# Patient Record
Sex: MALE | Race: AMERICAN INDIAN OR ALASKA NATIVE
[De-identification: names, ages, dates, MRNs, and addresses within clinical notes are randomized per-mention and may not be internally consistent; named-entity substitution may affect disease eponyms.]

---

## 2020-03-12 ENCOUNTER — HOSPITAL ENCOUNTER (OUTPATIENT)
Dept: HOSPITAL 35 - HYPER | Age: 80
End: 2020-03-12
Attending: PREVENTIVE MEDICINE
Payer: COMMERCIAL

## 2020-03-12 ENCOUNTER — HOSPITAL ENCOUNTER (OUTPATIENT)
Dept: HOSPITAL 35 - SJCVCIMAG | Age: 80
End: 2020-03-12
Attending: PREVENTIVE MEDICINE
Payer: COMMERCIAL

## 2020-03-12 DIAGNOSIS — T81.31XA: Primary | ICD-10-CM

## 2020-03-12 DIAGNOSIS — L97.312: ICD-10-CM

## 2020-03-12 DIAGNOSIS — Z98.41: ICD-10-CM

## 2020-03-12 DIAGNOSIS — I70.202: ICD-10-CM

## 2020-03-12 DIAGNOSIS — F32.9: ICD-10-CM

## 2020-03-12 DIAGNOSIS — E78.5: ICD-10-CM

## 2020-03-12 DIAGNOSIS — Z95.0: ICD-10-CM
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
## 2020-03-12 DIAGNOSIS — I10: ICD-10-CM

## 2020-03-12 DIAGNOSIS — Z98.42: ICD-10-CM

## 2020-03-12 DIAGNOSIS — F17.210: ICD-10-CM
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
## 2020-03-12 DIAGNOSIS — I70.233: Primary | ICD-10-CM

## 2020-03-12 DIAGNOSIS — I25.10: ICD-10-CM

## 2020-03-12 DIAGNOSIS — L97.319: ICD-10-CM

## 2020-03-12 DIAGNOSIS — Z79.01: ICD-10-CM

## 2020-03-12 DIAGNOSIS — M19.90: ICD-10-CM

## 2020-03-12 DIAGNOSIS — Z95.1: ICD-10-CM

## 2020-03-16 ENCOUNTER — HOSPITAL ENCOUNTER (OUTPATIENT)
Dept: HOSPITAL 35 - CATH | Age: 80
Discharge: HOME | End: 2020-03-16
Attending: RADIOLOGY
Payer: MEDICARE

## 2020-03-16 VITALS — WEIGHT: 165.35 LBS | HEIGHT: 68 IN | BODY MASS INDEX: 25.06 KG/M2

## 2020-03-16 VITALS — SYSTOLIC BLOOD PRESSURE: 128 MMHG | DIASTOLIC BLOOD PRESSURE: 80 MMHG

## 2020-03-16 DIAGNOSIS — Z86.73: ICD-10-CM

## 2020-03-16 DIAGNOSIS — J43.9: ICD-10-CM

## 2020-03-16 DIAGNOSIS — I70.1: ICD-10-CM

## 2020-03-16 DIAGNOSIS — I77.1: ICD-10-CM

## 2020-03-16 DIAGNOSIS — L97.919: ICD-10-CM

## 2020-03-16 DIAGNOSIS — E78.5: ICD-10-CM

## 2020-03-16 DIAGNOSIS — Z79.899: ICD-10-CM

## 2020-03-16 DIAGNOSIS — I73.9: Primary | ICD-10-CM

## 2020-03-16 DIAGNOSIS — Z98.890: ICD-10-CM

## 2020-03-16 DIAGNOSIS — Z95.0: ICD-10-CM

## 2020-03-16 DIAGNOSIS — I10: ICD-10-CM

## 2020-03-16 DIAGNOSIS — K21.9: ICD-10-CM

## 2020-03-16 DIAGNOSIS — F17.210: ICD-10-CM

## 2020-03-16 LAB
ANION GAP SERPL CALC-SCNC: 8 MMOL/L (ref 7–16)
BUN SERPL-MCNC: 19 MG/DL (ref 7–18)
CALCIUM SERPL-MCNC: 9.2 MG/DL (ref 8.5–10.1)
CHLORIDE SERPL-SCNC: 106 MMOL/L (ref 98–107)
CO2 SERPL-SCNC: 28 MMOL/L (ref 21–32)
CREAT SERPL-MCNC: 1.1 MG/DL (ref 0.7–1.3)
ERYTHROCYTE [DISTWIDTH] IN BLOOD BY AUTOMATED COUNT: 14.6 % (ref 10.5–14.5)
GLUCOSE SERPL-MCNC: 99 MG/DL (ref 74–106)
HCT VFR BLD CALC: 40.1 % (ref 42–52)
HGB BLD-MCNC: 13.2 GM/DL (ref 14–18)
MCH RBC QN AUTO: 31.1 PG (ref 26–34)
MCHC RBC AUTO-ENTMCNC: 33 G/DL (ref 28–37)
MCV RBC: 94.3 FL (ref 80–100)
PLATELET # BLD: 175 THOU/UL (ref 150–400)
POTASSIUM SERPL-SCNC: 4 MMOL/L (ref 3.5–5.1)
RBC # BLD AUTO: 4.25 MIL/UL (ref 4.5–6)
SODIUM SERPL-SCNC: 142 MMOL/L (ref 136–145)
WBC # BLD AUTO: 6.6 THOU/UL (ref 4–11)

## 2020-03-23 ENCOUNTER — HOSPITAL ENCOUNTER (OUTPATIENT)
Dept: HOSPITAL 35 - CATH | Age: 80
Discharge: HOME | End: 2020-03-23
Attending: RADIOLOGY
Payer: MEDICARE

## 2020-03-23 VITALS — DIASTOLIC BLOOD PRESSURE: 80 MMHG | SYSTOLIC BLOOD PRESSURE: 138 MMHG

## 2020-03-23 VITALS — SYSTOLIC BLOOD PRESSURE: 149 MMHG | DIASTOLIC BLOOD PRESSURE: 99 MMHG

## 2020-03-23 VITALS — SYSTOLIC BLOOD PRESSURE: 149 MMHG | DIASTOLIC BLOOD PRESSURE: 101 MMHG

## 2020-03-23 VITALS — SYSTOLIC BLOOD PRESSURE: 147 MMHG | DIASTOLIC BLOOD PRESSURE: 88 MMHG

## 2020-03-23 VITALS — DIASTOLIC BLOOD PRESSURE: 79 MMHG | SYSTOLIC BLOOD PRESSURE: 129 MMHG

## 2020-03-23 VITALS — DIASTOLIC BLOOD PRESSURE: 99 MMHG | SYSTOLIC BLOOD PRESSURE: 136 MMHG

## 2020-03-23 VITALS — DIASTOLIC BLOOD PRESSURE: 105 MMHG | SYSTOLIC BLOOD PRESSURE: 161 MMHG

## 2020-03-23 VITALS — SYSTOLIC BLOOD PRESSURE: 149 MMHG | DIASTOLIC BLOOD PRESSURE: 103 MMHG

## 2020-03-23 VITALS — SYSTOLIC BLOOD PRESSURE: 154 MMHG | DIASTOLIC BLOOD PRESSURE: 101 MMHG

## 2020-03-23 VITALS — HEIGHT: 69 IN | BODY MASS INDEX: 24.44 KG/M2 | WEIGHT: 165 LBS

## 2020-03-23 DIAGNOSIS — Z95.1: ICD-10-CM

## 2020-03-23 DIAGNOSIS — Z79.899: ICD-10-CM

## 2020-03-23 DIAGNOSIS — E78.5: ICD-10-CM

## 2020-03-23 DIAGNOSIS — I11.0: ICD-10-CM

## 2020-03-23 DIAGNOSIS — Z98.890: ICD-10-CM

## 2020-03-23 DIAGNOSIS — Z79.01: ICD-10-CM

## 2020-03-23 DIAGNOSIS — I50.9: ICD-10-CM

## 2020-03-23 DIAGNOSIS — Z95.0: ICD-10-CM

## 2020-03-23 DIAGNOSIS — Z71.6: ICD-10-CM

## 2020-03-23 DIAGNOSIS — L97.911: ICD-10-CM

## 2020-03-23 DIAGNOSIS — F17.210: ICD-10-CM

## 2020-03-23 DIAGNOSIS — I70.238: Primary | ICD-10-CM

## 2020-03-23 DIAGNOSIS — J43.9: ICD-10-CM

## 2020-03-23 DIAGNOSIS — I48.0: ICD-10-CM

## 2020-03-23 DIAGNOSIS — Z86.73: ICD-10-CM

## 2020-03-23 PROCEDURE — 10081 I&D PILONIDAL CYST COMP: CPT

## 2020-03-23 NOTE — NUR
ASSUMED CARE OF PT AT APPROX 1630. PT FROM CATH LAB, POST ANGIOGRAM WITH 2
STENTS IN R SFA, THROUGH R GROIN. GROIN IS CDI AND REMAINS SOFT, WITHOUT
HEMATOMA AT THIS TIME. BEDREST COMPLETE AT 1900. AFTER WALKING PT, IF HE IS
STABLE CAN BE DISCHARGED. WILL CONTINUE TO MONITOR.

## 2020-03-23 NOTE — NUR
PT R GROIN SITE CONTINUES TO BE CDI, SOFT WITH NO HEMATOMA. VSS. NO C/O PAIN
OR SOA. DAUGHTER REMAINS AT BEDSIDE AND WILL PROVIDE RIDE HOME. DISCHARGE
ORDERS COMPLETE. TELE AND IV DC'D.

## 2020-03-26 ENCOUNTER — HOSPITAL ENCOUNTER (OUTPATIENT)
Dept: HOSPITAL 35 - HYPER | Age: 80
End: 2020-03-26
Attending: PREVENTIVE MEDICINE
Payer: MEDICARE

## 2020-03-26 DIAGNOSIS — I10: ICD-10-CM

## 2020-03-26 DIAGNOSIS — H26.9: ICD-10-CM

## 2020-03-26 DIAGNOSIS — L97.312: ICD-10-CM
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
## 2020-03-26 DIAGNOSIS — E78.5: ICD-10-CM

## 2020-03-26 DIAGNOSIS — I25.10: ICD-10-CM

## 2020-03-26 DIAGNOSIS — T81.31XD: Primary | ICD-10-CM

## 2020-03-26 DIAGNOSIS — F32.9: ICD-10-CM

## 2020-03-26 DIAGNOSIS — M19.90: ICD-10-CM

## 2020-03-26 DIAGNOSIS — F17.210: ICD-10-CM

## 2020-04-07 ENCOUNTER — HOSPITAL ENCOUNTER (OUTPATIENT)
Dept: HOSPITAL 35 - HYPER | Age: 80
End: 2020-04-07
Attending: PREVENTIVE MEDICINE
Payer: MEDICARE
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
## 2020-04-07 DIAGNOSIS — I10: ICD-10-CM

## 2020-04-07 DIAGNOSIS — T81.31XD: Primary | ICD-10-CM

## 2020-04-07 DIAGNOSIS — Z47.1: ICD-10-CM

## 2020-04-07 DIAGNOSIS — M19.90: ICD-10-CM

## 2020-04-07 DIAGNOSIS — E78.5: ICD-10-CM

## 2020-04-07 DIAGNOSIS — I25.10: ICD-10-CM

## 2020-04-07 DIAGNOSIS — F32.9: ICD-10-CM

## 2020-04-07 DIAGNOSIS — L97.312: ICD-10-CM

## 2020-04-07 DIAGNOSIS — F17.200: ICD-10-CM

## 2020-04-21 ENCOUNTER — HOSPITAL ENCOUNTER (OUTPATIENT)
Dept: HOSPITAL 35 - HYPER | Age: 80
End: 2020-04-21
Attending: EMERGENCY MEDICINE
Payer: MEDICARE

## 2020-04-21 DIAGNOSIS — F32.9: ICD-10-CM

## 2020-04-21 DIAGNOSIS — Z95.1: ICD-10-CM

## 2020-04-21 DIAGNOSIS — H26.9: ICD-10-CM
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
## 2020-04-21 DIAGNOSIS — T81.31XD: Primary | ICD-10-CM

## 2020-04-21 DIAGNOSIS — M19.90: ICD-10-CM

## 2020-04-21 DIAGNOSIS — F17.210: ICD-10-CM

## 2020-04-21 DIAGNOSIS — L97.312: ICD-10-CM

## 2020-04-21 DIAGNOSIS — E78.5: ICD-10-CM

## 2020-04-21 DIAGNOSIS — I10: ICD-10-CM

## 2020-04-28 ENCOUNTER — HOSPITAL ENCOUNTER (OUTPATIENT)
Dept: HOSPITAL 35 - HYPER | Age: 80
End: 2020-04-28
Attending: PREVENTIVE MEDICINE
Payer: MEDICARE

## 2020-04-28 ENCOUNTER — HOSPITAL ENCOUNTER (OUTPATIENT)
Dept: HOSPITAL 35 - SJCVCIMAG | Age: 80
End: 2020-04-28
Attending: RADIOLOGY
Payer: MEDICARE

## 2020-04-28 DIAGNOSIS — I73.9: Primary | ICD-10-CM

## 2020-04-28 DIAGNOSIS — Z98.890: ICD-10-CM

## 2020-04-28 DIAGNOSIS — Z86.79: ICD-10-CM

## 2020-04-28 DIAGNOSIS — F17.210: ICD-10-CM

## 2020-04-28 DIAGNOSIS — H26.9: ICD-10-CM

## 2020-04-28 DIAGNOSIS — I25.10: ICD-10-CM

## 2020-04-28 DIAGNOSIS — T81.31XD: Primary | ICD-10-CM

## 2020-04-28 DIAGNOSIS — Z79.01: ICD-10-CM

## 2020-04-28 DIAGNOSIS — L97.312: ICD-10-CM

## 2020-04-28 DIAGNOSIS — M19.90: ICD-10-CM

## 2020-04-28 DIAGNOSIS — I10: ICD-10-CM

## 2020-04-28 DIAGNOSIS — I48.0: ICD-10-CM

## 2020-04-28 DIAGNOSIS — I71.4: ICD-10-CM
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
## 2020-04-28 DIAGNOSIS — Z79.82: ICD-10-CM

## 2020-04-28 DIAGNOSIS — F32.9: ICD-10-CM

## 2020-04-28 DIAGNOSIS — E78.5: ICD-10-CM

## 2020-05-01 ENCOUNTER — HOSPITAL ENCOUNTER (OUTPATIENT)
Dept: HOSPITAL 35 - CATH | Age: 80
Discharge: HOME | End: 2020-05-01
Attending: RADIOLOGY
Payer: MEDICARE

## 2020-05-01 VITALS — HEIGHT: 68 IN | BODY MASS INDEX: 25.01 KG/M2 | WEIGHT: 165 LBS

## 2020-05-01 VITALS — SYSTOLIC BLOOD PRESSURE: 93 MMHG | DIASTOLIC BLOOD PRESSURE: 58 MMHG

## 2020-05-01 VITALS — DIASTOLIC BLOOD PRESSURE: 51 MMHG | SYSTOLIC BLOOD PRESSURE: 88 MMHG

## 2020-05-01 VITALS — DIASTOLIC BLOOD PRESSURE: 64 MMHG | SYSTOLIC BLOOD PRESSURE: 91 MMHG

## 2020-05-01 VITALS — DIASTOLIC BLOOD PRESSURE: 92 MMHG | SYSTOLIC BLOOD PRESSURE: 156 MMHG

## 2020-05-01 DIAGNOSIS — F17.210: ICD-10-CM

## 2020-05-01 DIAGNOSIS — J43.9: ICD-10-CM

## 2020-05-01 DIAGNOSIS — Z79.01: ICD-10-CM

## 2020-05-01 DIAGNOSIS — Z98.890: ICD-10-CM

## 2020-05-01 DIAGNOSIS — Z79.899: ICD-10-CM

## 2020-05-01 DIAGNOSIS — I70.213: Primary | ICD-10-CM

## 2020-05-01 DIAGNOSIS — M79.605: ICD-10-CM

## 2020-05-01 DIAGNOSIS — E78.5: ICD-10-CM

## 2020-05-01 DIAGNOSIS — K21.9: ICD-10-CM

## 2020-05-01 DIAGNOSIS — Z95.1: ICD-10-CM

## 2020-05-01 LAB
ANION GAP SERPL CALC-SCNC: 7 MMOL/L (ref 7–16)
BASOPHILS NFR BLD AUTO: 0.8 % (ref 0–2)
BUN SERPL-MCNC: 26 MG/DL (ref 7–18)
CALCIUM SERPL-MCNC: 8.8 MG/DL (ref 8.5–10.1)
CHLORIDE SERPL-SCNC: 106 MMOL/L (ref 98–107)
CO2 SERPL-SCNC: 27 MMOL/L (ref 21–32)
CREAT SERPL-MCNC: 1.1 MG/DL (ref 0.7–1.3)
EOSINOPHIL NFR BLD: 2.5 % (ref 0–3)
ERYTHROCYTE [DISTWIDTH] IN BLOOD BY AUTOMATED COUNT: 14.9 % (ref 10.5–14.5)
GLUCOSE SERPL-MCNC: 90 MG/DL (ref 74–106)
GRANULOCYTES NFR BLD MANUAL: 55.2 % (ref 36–66)
HCT VFR BLD CALC: 36.2 % (ref 42–52)
HGB BLD-MCNC: 12.1 GM/DL (ref 14–18)
LYMPHOCYTES NFR BLD AUTO: 31.1 % (ref 24–44)
MCH RBC QN AUTO: 31.2 PG (ref 26–34)
MCHC RBC AUTO-ENTMCNC: 33.4 G/DL (ref 28–37)
MCV RBC: 93.4 FL (ref 80–100)
MONOCYTES NFR BLD: 10.4 % (ref 1–8)
NEUTROPHILS # BLD: 3 THOU/UL (ref 1.4–8.2)
PLATELET # BLD: 143 THOU/UL (ref 150–400)
POTASSIUM SERPL-SCNC: 3.9 MMOL/L (ref 3.5–5.1)
RBC # BLD AUTO: 3.88 MIL/UL (ref 4.5–6)
SODIUM SERPL-SCNC: 140 MMOL/L (ref 136–145)
WBC # BLD AUTO: 5.5 THOU/UL (ref 4–11)

## 2020-05-05 ENCOUNTER — HOSPITAL ENCOUNTER (OUTPATIENT)
Dept: HOSPITAL 35 - HYPER | Age: 80
End: 2020-05-05
Attending: PREVENTIVE MEDICINE
Payer: MEDICARE

## 2020-05-05 DIAGNOSIS — T81.31XD: Primary | ICD-10-CM

## 2020-05-05 DIAGNOSIS — H26.9: ICD-10-CM

## 2020-05-05 DIAGNOSIS — F32.9: ICD-10-CM
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
## 2020-05-05 DIAGNOSIS — M19.90: ICD-10-CM

## 2020-05-05 DIAGNOSIS — Z47.1: ICD-10-CM

## 2020-05-05 DIAGNOSIS — I25.10: ICD-10-CM

## 2020-05-05 DIAGNOSIS — E78.5: ICD-10-CM

## 2020-05-05 DIAGNOSIS — F17.210: ICD-10-CM

## 2020-05-05 DIAGNOSIS — L97.312: ICD-10-CM

## 2020-05-05 DIAGNOSIS — L84: ICD-10-CM

## 2020-05-05 DIAGNOSIS — I10: ICD-10-CM

## 2020-05-12 ENCOUNTER — HOSPITAL ENCOUNTER (OUTPATIENT)
Dept: HOSPITAL 35 - HYPER | Age: 80
End: 2020-05-12
Attending: PREVENTIVE MEDICINE
Payer: MEDICARE

## 2020-05-12 DIAGNOSIS — L97.312: ICD-10-CM

## 2020-05-12 DIAGNOSIS — I10: ICD-10-CM

## 2020-05-12 DIAGNOSIS — T81.31XD: Primary | ICD-10-CM

## 2020-05-12 DIAGNOSIS — M19.90: ICD-10-CM

## 2020-05-12 DIAGNOSIS — E78.5: ICD-10-CM

## 2020-05-12 DIAGNOSIS — Z79.82: ICD-10-CM

## 2020-05-12 DIAGNOSIS — F32.9: ICD-10-CM

## 2020-05-12 DIAGNOSIS — H26.9: ICD-10-CM

## 2020-05-12 DIAGNOSIS — I25.10: ICD-10-CM

## 2020-05-12 DIAGNOSIS — Z79.01: ICD-10-CM
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
## 2020-05-12 DIAGNOSIS — F17.210: ICD-10-CM

## 2020-05-20 ENCOUNTER — HOSPITAL ENCOUNTER (OUTPATIENT)
Dept: HOSPITAL 35 - HYPER | Age: 80
End: 2020-05-20
Attending: PREVENTIVE MEDICINE
Payer: MEDICARE

## 2020-05-20 DIAGNOSIS — H26.9: ICD-10-CM

## 2020-05-20 DIAGNOSIS — F17.210: ICD-10-CM

## 2020-05-20 DIAGNOSIS — M19.90: ICD-10-CM

## 2020-05-20 DIAGNOSIS — L97.312: ICD-10-CM

## 2020-05-20 DIAGNOSIS — Z47.1: ICD-10-CM

## 2020-05-20 DIAGNOSIS — I25.10: ICD-10-CM

## 2020-05-20 DIAGNOSIS — F32.9: ICD-10-CM

## 2020-05-20 DIAGNOSIS — I10: ICD-10-CM

## 2020-05-20 DIAGNOSIS — E78.5: ICD-10-CM

## 2020-05-20 DIAGNOSIS — Z95.1: ICD-10-CM

## 2020-05-20 DIAGNOSIS — T81.31XD: Primary | ICD-10-CM

## 2020-05-20 DIAGNOSIS — L84: ICD-10-CM
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
## 2020-06-09 ENCOUNTER — HOSPITAL ENCOUNTER (OUTPATIENT)
Dept: HOSPITAL 35 - HYPER | Age: 80
End: 2020-06-09
Attending: PREVENTIVE MEDICINE
Payer: MEDICARE

## 2020-06-09 DIAGNOSIS — F32.9: ICD-10-CM

## 2020-06-09 DIAGNOSIS — H26.9: ICD-10-CM

## 2020-06-09 DIAGNOSIS — I10: ICD-10-CM

## 2020-06-09 DIAGNOSIS — T81.31XD: Primary | ICD-10-CM

## 2020-06-09 DIAGNOSIS — E78.5: ICD-10-CM

## 2020-06-09 DIAGNOSIS — F17.200: ICD-10-CM

## 2020-06-09 DIAGNOSIS — M19.90: ICD-10-CM

## 2020-06-09 DIAGNOSIS — Z47.1: ICD-10-CM
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
## 2020-06-09 DIAGNOSIS — L97.312: ICD-10-CM

## 2020-06-09 DIAGNOSIS — I25.10: ICD-10-CM

## 2020-08-04 ENCOUNTER — HOSPITAL ENCOUNTER (OUTPATIENT)
Dept: HOSPITAL 35 - SJCVCIMAG | Age: 80
End: 2020-08-04
Attending: RADIOLOGY
Payer: MEDICARE

## 2020-08-04 DIAGNOSIS — Z79.899: ICD-10-CM

## 2020-08-04 DIAGNOSIS — I48.0: ICD-10-CM

## 2020-08-04 DIAGNOSIS — Z95.820: ICD-10-CM

## 2020-08-04 DIAGNOSIS — I70.203: Primary | ICD-10-CM

## 2020-08-04 DIAGNOSIS — Z86.79: ICD-10-CM

## 2020-08-04 DIAGNOSIS — Z95.0: ICD-10-CM

## 2020-08-04 DIAGNOSIS — Z95.1: ICD-10-CM

## 2020-08-04 DIAGNOSIS — E78.00: ICD-10-CM

## 2020-08-04 DIAGNOSIS — I25.810: ICD-10-CM

## 2020-08-04 DIAGNOSIS — I65.23: ICD-10-CM

## 2020-08-04 DIAGNOSIS — I71.4: ICD-10-CM

## 2020-08-04 DIAGNOSIS — I10: ICD-10-CM
